# Patient Record
Sex: FEMALE | Race: WHITE | NOT HISPANIC OR LATINO | URBAN - NONMETROPOLITAN AREA
[De-identification: names, ages, dates, MRNs, and addresses within clinical notes are randomized per-mention and may not be internally consistent; named-entity substitution may affect disease eponyms.]

---

## 2021-10-19 ENCOUNTER — IMPORTED ENCOUNTER (OUTPATIENT)
Dept: URBAN - NONMETROPOLITAN AREA CLINIC 1 | Facility: CLINIC | Age: 81
End: 2021-10-19

## 2021-10-19 PROBLEM — H01.004: Noted: 2021-10-19

## 2021-10-19 PROBLEM — Z96.1: Noted: 2021-10-19

## 2021-10-19 PROBLEM — H01.012: Noted: 2021-10-19

## 2021-10-19 PROBLEM — H01.014: Noted: 2021-10-19

## 2021-10-19 PROBLEM — H01.011: Noted: 2021-10-19

## 2021-10-19 PROCEDURE — 99203 OFFICE O/P NEW LOW 30 MIN: CPT

## 2021-10-19 NOTE — PATIENT DISCUSSION
Blepharitis-Discussed with pt.-Recommend pt begin warm compresses and lid scrubs BID. Pt instructed on proper lid scrub technique. -Recommend artificial tears OU QID PRN. 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 Uses computer a lot. does well w/o glasses. Uses them mostly for driving. Raising 5 yo grandson.

## 2022-04-09 ASSESSMENT — VISUAL ACUITY
OU_CC: 20/40
OD_CC: 20/40
OU_SC: 20/30
OS_CC: 20/40
OS_SC: 20/30
OD_SC: 20/30

## 2023-05-15 ENCOUNTER — ESTABLISHED PATIENT (OUTPATIENT)
Dept: RURAL CLINIC 1 | Facility: CLINIC | Age: 83
End: 2023-05-15

## 2023-05-15 DIAGNOSIS — H01.011: ICD-10-CM

## 2023-05-15 DIAGNOSIS — H01.014: ICD-10-CM

## 2023-05-15 DIAGNOSIS — Z96.1: ICD-10-CM

## 2023-05-15 DIAGNOSIS — E11.9: ICD-10-CM

## 2023-05-15 PROCEDURE — 92014 COMPRE OPH EXAM EST PT 1/>: CPT

## 2023-05-15 ASSESSMENT — TONOMETRY
OS_IOP_MMHG: 15
OD_IOP_MMHG: 15

## 2023-05-15 ASSESSMENT — VISUAL ACUITY
OD_SC: 20/40
OS_SC: 20/30-2

## 2025-03-04 ENCOUNTER — COMPREHENSIVE EXAM (OUTPATIENT)
Age: 85
End: 2025-03-04

## 2025-03-04 DIAGNOSIS — H01.014: ICD-10-CM

## 2025-03-04 DIAGNOSIS — H01.011: ICD-10-CM

## 2025-03-04 DIAGNOSIS — Z96.1: ICD-10-CM

## 2025-03-04 DIAGNOSIS — Z98.890: ICD-10-CM

## 2025-03-04 DIAGNOSIS — E11.9: ICD-10-CM

## 2025-03-04 PROCEDURE — 92014 COMPRE OPH EXAM EST PT 1/>: CPT
